# Patient Record
Sex: MALE | Race: WHITE | NOT HISPANIC OR LATINO | Employment: UNEMPLOYED | URBAN - NONMETROPOLITAN AREA
[De-identification: names, ages, dates, MRNs, and addresses within clinical notes are randomized per-mention and may not be internally consistent; named-entity substitution may affect disease eponyms.]

---

## 2019-05-14 ENCOUNTER — OFFICE VISIT (OUTPATIENT)
Dept: CARDIOLOGY | Facility: CLINIC | Age: 79
End: 2019-05-14

## 2019-05-14 VITALS
BODY MASS INDEX: 21.01 KG/M2 | HEIGHT: 68 IN | SYSTOLIC BLOOD PRESSURE: 160 MMHG | HEART RATE: 52 BPM | DIASTOLIC BLOOD PRESSURE: 72 MMHG | WEIGHT: 138.6 LBS | OXYGEN SATURATION: 100 %

## 2019-05-14 DIAGNOSIS — I95.1 ORTHOSTASIS: ICD-10-CM

## 2019-05-14 DIAGNOSIS — R06.02 SHORTNESS OF BREATH: ICD-10-CM

## 2019-05-14 DIAGNOSIS — R00.0 TACHYCARDIA: ICD-10-CM

## 2019-05-14 DIAGNOSIS — I10 ESSENTIAL HYPERTENSION: Primary | ICD-10-CM

## 2019-05-14 DIAGNOSIS — R42 DIZZINESS: ICD-10-CM

## 2019-05-14 PROCEDURE — 93000 ELECTROCARDIOGRAM COMPLETE: CPT | Performed by: PHYSICIAN ASSISTANT

## 2019-05-14 PROCEDURE — 99204 OFFICE O/P NEW MOD 45 MIN: CPT | Performed by: PHYSICIAN ASSISTANT

## 2019-05-14 RX ORDER — ALBUTEROL SULFATE 1.25 MG/3ML
1 SOLUTION RESPIRATORY (INHALATION) EVERY 6 HOURS PRN
COMMUNITY

## 2019-05-14 RX ORDER — OLANZAPINE 7.5 MG/1
7.5 TABLET ORAL NIGHTLY
COMMUNITY

## 2019-05-14 RX ORDER — LATANOPROST 50 UG/ML
1 SOLUTION/ DROPS OPHTHALMIC NIGHTLY
COMMUNITY

## 2019-05-14 RX ORDER — FERROUS SULFATE 325(65) MG
325 TABLET ORAL
COMMUNITY

## 2019-05-14 RX ORDER — CHLORAL HYDRATE 500 MG
1000 CAPSULE ORAL
COMMUNITY

## 2019-05-14 RX ORDER — ERGOCALCIFEROL 1.25 MG/1
50000 CAPSULE ORAL WEEKLY
COMMUNITY

## 2019-05-14 RX ORDER — BENZONATATE 100 MG/1
100 CAPSULE ORAL AS NEEDED
COMMUNITY

## 2019-05-14 RX ORDER — ATORVASTATIN CALCIUM 20 MG/1
20 TABLET, FILM COATED ORAL DAILY
COMMUNITY

## 2019-05-14 RX ORDER — BENZTROPINE MESYLATE 1 MG/1
1 TABLET ORAL DAILY
COMMUNITY

## 2019-05-14 RX ORDER — OMEPRAZOLE 20 MG/1
20 CAPSULE, DELAYED RELEASE ORAL DAILY
COMMUNITY

## 2019-05-14 RX ORDER — SERTRALINE HYDROCHLORIDE 100 MG/1
100 TABLET, FILM COATED ORAL DAILY
COMMUNITY

## 2019-05-14 RX ORDER — NAPROXEN SODIUM 550 MG/1
550 TABLET ORAL 2 TIMES DAILY PRN
COMMUNITY

## 2019-05-14 RX ORDER — NIACIN 500 MG
500 TABLET ORAL NIGHTLY
COMMUNITY

## 2019-05-14 RX ORDER — LISINOPRIL 10 MG/1
10 TABLET ORAL DAILY
COMMUNITY

## 2019-05-14 RX ORDER — LOPERAMIDE HYDROCHLORIDE 2 MG/1
2 CAPSULE ORAL AS NEEDED
COMMUNITY

## 2019-05-14 NOTE — PROGRESS NOTES
Subjective   Killian Barrios is a 79 y.o. male     Chief Complaint   Patient presents with   • Establish Care   • Hypertension       HPI  The patient presents today for initial evaluation.  He is referred in the setting of hypotension, orthostasis, and presyncope/dizziness.  The patient denies cardiovascular history.  He has had no cardiovascular evaluation in the past.  The patient is a resident of a long-term care facility.  Over the past few weeks he has started noticing more dizziness, weakness, and presyncope.  He has even had falls because of these episodes.  Blood pressures have been checked and the patient is noted to be hypotensive.  He initially was discontinued off amlodipine.  Then eventually was discontinued off metoprolol because of ongoing hypotension which was symptomatic.  He is very hypertensive today but typically has a blood pressure of 120/80 and last when checked at the long-term care facility.  The patient tells me that his presyncopal episodes typically are heralded by weakness and dizziness.  He will then have lower extremity weakness, occasionally severe to the point where he will fall to the ground.  He has no associated chest pain at that time.  He has ongoing dyspnea which can be fairly significant when he gets very dizzy.  At baseline, the patient has had no PND orthopnea.  Labs recently apparently were unremarkable.  He has no further complaints otherwise.      Current Outpatient Medications   Medication Sig Dispense Refill   • albuterol (ACCUNEB) 1.25 MG/3ML nebulizer solution Take 1 ampule by nebulization Every 6 (Six) Hours As Needed for Wheezing.     • ascorbic acid (VITAMIN C) 1000 MG tablet Take 1,000 mg by mouth Daily.     • atorvastatin (LIPITOR) 20 MG tablet Take 20 mg by mouth Daily.     • benzonatate (TESSALON) 100 MG capsule Take 100 mg by mouth As Needed for Cough.     • benztropine (COGENTIN) 1 MG tablet Take 1 mg by mouth Daily.     • ferrous sulfate 325 (65 FE) MG tablet  Take 325 mg by mouth Daily With Breakfast.     • latanoprost (XALATAN) 0.005 % ophthalmic solution 1 drop Every Night.     • lisinopril (PRINIVIL,ZESTRIL) 10 MG tablet Take 10 mg by mouth Daily.     • loperamide (IMODIUM) 2 MG capsule Take 2 mg by mouth As Needed for Diarrhea.     • naproxen sodium (ANAPROX) 550 MG tablet Take 550 mg by mouth 2 (Two) Times a Day As Needed for Mild Pain .     • niacin 500 MG tablet Take 500 mg by mouth Every Night.     • OLANZapine (zyPREXA) 7.5 MG tablet Take 7.5 mg by mouth Every Night.     • Omega-3 Fatty Acids (FISH OIL) 1000 MG capsule capsule Take 1,000 mg by mouth Daily With Breakfast.     • omeprazole (priLOSEC) 20 MG capsule Take 20 mg by mouth Daily.     • sertraline (ZOLOFT) 100 MG tablet Take 100 mg by mouth Daily.     • vitamin D (ERGOCALCIFEROL) 19721 units capsule capsule Take 50,000 Units by mouth 1 (One) Time Per Week.       No current facility-administered medications for this visit.        Patient has no known allergies.    Past Medical History:   Diagnosis Date   • Cancer (CMS/HCC)     skin CA   • Hyperlipidemia    • Hypertension        Social History     Socioeconomic History   • Marital status:      Spouse name: Not on file   • Number of children: Not on file   • Years of education: Not on file   • Highest education level: Not on file   Tobacco Use   • Smoking status: Former Smoker     Packs/day: 0.50     Years: 30.00     Pack years: 15.00     Types: Cigarettes   • Smokeless tobacco: Never Used   Substance and Sexual Activity   • Alcohol use: No     Frequency: Never   • Drug use: No   • Sexual activity: Defer       Family History   Problem Relation Age of Onset   • Hypertension Mother    • Lung cancer Father        Review of Systems   Constitutional: Positive for fatigue (easily fatigued).   HENT: Negative.  Negative for congestion, rhinorrhea and sneezing.    Eyes: Positive for visual disturbance (wears glasses daily).   Respiratory: Positive for cough  "(cough with blood in spuctum) and shortness of breath (easily SOA ; worse on exertion ). Negative for chest tightness and wheezing.    Cardiovascular: Negative.  Negative for chest pain, palpitations and leg swelling.   Gastrointestinal: Negative.  Negative for abdominal pain, nausea and vomiting.   Endocrine: Negative.  Negative for cold intolerance and heat intolerance.   Genitourinary: Positive for urgency (urinary urgency). Negative for difficulty urinating and frequency.   Musculoskeletal: Negative.  Negative for arthralgias, back pain, neck pain and neck stiffness.   Skin: Negative.  Negative for rash and wound.   Allergic/Immunologic: Negative.  Negative for environmental allergies and food allergies.   Neurological: Positive for weakness (increased generalized weakness). Negative for dizziness, syncope, light-headedness and headaches.   Hematological: Negative.  Does not bruise/bleed easily.   Psychiatric/Behavioral: Positive for confusion (easily confused). Negative for agitation and sleep disturbance (denies waking up smothering/SOA). The patient is nervous/anxious (easily nervous/anxious).        Objective     Vitals:    05/14/19 1425   BP: 160/72   BP Location: Left arm   Patient Position: Sitting   Pulse: 52   SpO2: 100%   Weight: 62.9 kg (138 lb 9.6 oz)   Height: 172.7 cm (68\")        /72 (BP Location: Left arm, Patient Position: Sitting)   Pulse 52   Ht 172.7 cm (68\")   Wt 62.9 kg (138 lb 9.6 oz)   SpO2 100%   BMI 21.07 kg/m²      Lab Results (most recent)     None          Physical Exam   Constitutional: He is oriented to person, place, and time. He appears well-developed and well-nourished. No distress.   HENT:   Head: Normocephalic and atraumatic.   Eyes: Conjunctivae and EOM are normal. Pupils are equal, round, and reactive to light.   Neck: Normal range of motion. Neck supple. No JVD present. No tracheal deviation present.   Cardiovascular: Normal rate, regular rhythm, normal heart " sounds and intact distal pulses.   Pulmonary/Chest: Effort normal and breath sounds normal.   Abdominal: Soft. Bowel sounds are normal. He exhibits no distension and no mass. There is no tenderness. There is no rebound and no guarding.   Musculoskeletal: Normal range of motion. He exhibits no edema, tenderness or deformity.   Neurological: He is alert and oriented to person, place, and time.   Skin: Skin is warm and dry. No rash noted. No erythema. No pallor.   Psychiatric: He has a normal mood and affect. His behavior is normal. Judgment and thought content normal.   Nursing note and vitals reviewed.      Procedure     ECG 12 Lead  Date/Time: 5/14/2019 2:47 PM  Performed by: Pritesh Capone PA  Authorized by: Pritesh Capone PA   Comments: HTN    Sinus rhythm at 123, right axis deviation, incomplete right bundle branch block morphology, nonspecific ST and T wave changes which are minor, no acute changes noted.  No EKGs are utilized for comparison.                 Assessment/Plan      Diagnosis Plan   1. Essential hypertension  ECG 12 Lead    Adult Transthoracic Echo Complete W/ Cont if Necessary Per Protocol    Cardiac Event Monitor    Duplex Carotid Ultrasound CAR   2. Orthostasis  Adult Transthoracic Echo Complete W/ Cont if Necessary Per Protocol    Cardiac Event Monitor    Duplex Carotid Ultrasound CAR   3. Tachycardia  Adult Transthoracic Echo Complete W/ Cont if Necessary Per Protocol    Cardiac Event Monitor    Duplex Carotid Ultrasound CAR   4. Shortness of breath  Adult Transthoracic Echo Complete W/ Cont if Necessary Per Protocol    Cardiac Event Monitor    Duplex Carotid Ultrasound CAR   5. Dizziness  Duplex Carotid Ultrasound CAR       1.  The patient has orthostasis by exam today.  He was symptomatic of dizziness presyncope at that time by his report.  This mimics his episodes which he has at his long-term care facility.  These are improving however since metoprolol and amlodipine have been  discontinued.  I would like to decrease lisinopril at least to 5 mg daily.  I do not feel that this is contributing significantly to his orthostasis, however he has ongoing hypotension at times.  Hopefully will improve with some of the symptoms was decreasing that therapy as well.    2.  Otherwise, with ongoing symptoms of dizziness/presyncope, I feel the patient needs further cardiac evaluation otherwise.  I will schedule for a 14-day event monitor.  We will schedule for an echocardiogram to evaluate him structurally.  He needs to be evaluated for potential rate or rhythm disturbance contributing to symptoms.  He will need a carotid duplex as well given symptoms.    3.  I would like to see him back to review all the above studies.  At this time, shortness of air and symptoms otherwise are mostly stable.  We can make further recommendations once we can review results of the above studies.  He will call prior to his follow-up for any complications.         Patient's Body mass index is 21.07 kg/m². BMI is within normal parameters. No follow-up required..           Electronically signed by:

## 2019-06-04 ENCOUNTER — HOSPITAL ENCOUNTER (OUTPATIENT)
Dept: CARDIOLOGY | Facility: HOSPITAL | Age: 79
Discharge: HOME | End: 2019-06-04
Admitting: PHYSICIAN ASSISTANT

## 2019-06-04 ENCOUNTER — HOSPITAL ENCOUNTER (OUTPATIENT)
Dept: CARDIOLOGY | Facility: HOSPITAL | Age: 79
Discharge: HOME | End: 2019-06-04

## 2019-06-04 DIAGNOSIS — R06.02 SHORTNESS OF BREATH: ICD-10-CM

## 2019-06-04 DIAGNOSIS — R00.0 TACHYCARDIA: ICD-10-CM

## 2019-06-04 DIAGNOSIS — I10 ESSENTIAL HYPERTENSION: ICD-10-CM

## 2019-06-04 DIAGNOSIS — R42 DIZZINESS: ICD-10-CM

## 2019-06-04 DIAGNOSIS — I95.1 ORTHOSTASIS: ICD-10-CM

## 2019-06-04 PROCEDURE — 93880 EXTRACRANIAL BILAT STUDY: CPT

## 2019-06-04 PROCEDURE — 93306 TTE W/DOPPLER COMPLETE: CPT

## 2019-06-04 PROCEDURE — 93880 EXTRACRANIAL BILAT STUDY: CPT | Performed by: INTERNAL MEDICINE

## 2019-06-04 PROCEDURE — 93306 TTE W/DOPPLER COMPLETE: CPT | Performed by: INTERNAL MEDICINE

## 2019-06-10 LAB
BH CV ECHO MEAS - ACS: 2 CM
BH CV ECHO MEAS - AO MEAN PG: 2.7 MMHG
BH CV ECHO MEAS - AO ROOT AREA (BSA CORRECTED): 1.9
BH CV ECHO MEAS - AO ROOT AREA: 9.1 CM^2
BH CV ECHO MEAS - AO ROOT DIAM: 3.4 CM
BH CV ECHO MEAS - AO V2 MEAN: 76.1 CM/SEC
BH CV ECHO MEAS - AO V2 VTI: 19.7 CM
BH CV ECHO MEAS - BSA(HAYCOCK): 1.7 M^2
BH CV ECHO MEAS - BSA(HAYCOCK): 1.7 M^2
BH CV ECHO MEAS - BSA: 1.7 M^2
BH CV ECHO MEAS - BSA: 1.7 M^2
BH CV ECHO MEAS - BZI_BMI: 21 KILOGRAMS/M^2
BH CV ECHO MEAS - BZI_BMI: 21 KILOGRAMS/M^2
BH CV ECHO MEAS - BZI_METRIC_HEIGHT: 172.7 CM
BH CV ECHO MEAS - BZI_METRIC_HEIGHT: 172.7 CM
BH CV ECHO MEAS - BZI_METRIC_WEIGHT: 62.6 KG
BH CV ECHO MEAS - BZI_METRIC_WEIGHT: 62.6 KG
BH CV ECHO MEAS - EDV(CUBED): 60 ML
BH CV ECHO MEAS - EDV(MOD-SP4): 39 ML
BH CV ECHO MEAS - EDV(TEICH): 66.5 ML
BH CV ECHO MEAS - EF(CUBED): 54.9 %
BH CV ECHO MEAS - EF(MOD-SP4): 59 %
BH CV ECHO MEAS - EF(TEICH): 47.2 %
BH CV ECHO MEAS - ESV(CUBED): 27.1 ML
BH CV ECHO MEAS - ESV(MOD-SP4): 16 ML
BH CV ECHO MEAS - ESV(TEICH): 35.1 ML
BH CV ECHO MEAS - FS: 23.3 %
BH CV ECHO MEAS - IVS/LVPW: 0.93
BH CV ECHO MEAS - IVSD: 1.3 CM
BH CV ECHO MEAS - LA DIMENSION: 3 CM
BH CV ECHO MEAS - LA/AO: 0.89
BH CV ECHO MEAS - LV DIASTOLIC VOL/BSA (35-75): 22.3 ML/M^2
BH CV ECHO MEAS - LV IVRT: 0.12 SEC
BH CV ECHO MEAS - LV MASS(C)D: 193.4 GRAMS
BH CV ECHO MEAS - LV MASS(C)DI: 110.8 GRAMS/M^2
BH CV ECHO MEAS - LV SYSTOLIC VOL/BSA (12-30): 9.2 ML/M^2
BH CV ECHO MEAS - LVIDD: 3.9 CM
BH CV ECHO MEAS - LVIDS: 3 CM
BH CV ECHO MEAS - LVLD AP4: 6.8 CM
BH CV ECHO MEAS - LVLS AP4: 4.9 CM
BH CV ECHO MEAS - LVOT AREA (M): 2.8 CM^2
BH CV ECHO MEAS - LVOT AREA: 2.8 CM^2
BH CV ECHO MEAS - LVOT DIAM: 1.9 CM
BH CV ECHO MEAS - LVPWD: 1.4 CM
BH CV ECHO MEAS - MV A MAX VEL: 74 CM/SEC
BH CV ECHO MEAS - MV DEC SLOPE: 201.3 CM/SEC^2
BH CV ECHO MEAS - MV E MAX VEL: 48.9 CM/SEC
BH CV ECHO MEAS - MV E/A: 0.66
BH CV ECHO MEAS - RVDD: 2.7 CM
BH CV ECHO MEAS - SI(AO): 102.4 ML/M^2
BH CV ECHO MEAS - SI(CUBED): 18.9 ML/M^2
BH CV ECHO MEAS - SI(MOD-SP4): 13.2 ML/M^2
BH CV ECHO MEAS - SI(TEICH): 18 ML/M^2
BH CV ECHO MEAS - SV(AO): 178.7 ML
BH CV ECHO MEAS - SV(CUBED): 32.9 ML
BH CV ECHO MEAS - SV(MOD-SP4): 23 ML
BH CV ECHO MEAS - SV(TEICH): 31.4 ML
BH CV XLRA MEAS LEFT BULB EDV: 12.9 CM/SEC
BH CV XLRA MEAS LEFT BULB PSV: 55.9 CM/SEC
BH CV XLRA MEAS LEFT CCA RATIO VEL: 68.2 CM/SEC
BH CV XLRA MEAS LEFT DIST CCA EDV: 12.9 CM/SEC
BH CV XLRA MEAS LEFT DIST CCA PSV: 68.2 CM/SEC
BH CV XLRA MEAS LEFT DIST ICA EDV: -29 CM/SEC
BH CV XLRA MEAS LEFT DIST ICA PSV: -146 CM/SEC
BH CV XLRA MEAS LEFT ICA RATIO VEL: -146 CM/SEC
BH CV XLRA MEAS LEFT ICA/CCA RATIO: -2.1
BH CV XLRA MEAS LEFT MID CCA EDV: 11.3 CM/SEC
BH CV XLRA MEAS LEFT MID CCA PSV: 76.3 CM/SEC
BH CV XLRA MEAS LEFT MID ICA EDV: -26.7 CM/SEC
BH CV XLRA MEAS LEFT MID ICA PSV: -111 CM/SEC
BH CV XLRA MEAS LEFT PROX CCA EDV: 11.3 CM/SEC
BH CV XLRA MEAS LEFT PROX CCA PSV: 81.6 CM/SEC
BH CV XLRA MEAS LEFT PROX ECA EDV: 0 CM/SEC
BH CV XLRA MEAS LEFT PROX ECA PSV: -128 CM/SEC
BH CV XLRA MEAS LEFT PROX ICA EDV: -26 CM/SEC
BH CV XLRA MEAS LEFT PROX ICA PSV: -95.5 CM/SEC
BH CV XLRA MEAS LEFT VERTEBRAL A EDV: 14.5 CM/SEC
BH CV XLRA MEAS LEFT VERTEBRAL A PSV: 63.4 CM/SEC
BH CV XLRA MEAS RIGHT BULB EDV: 5.9 CM/SEC
BH CV XLRA MEAS RIGHT BULB PSV: 43 CM/SEC
BH CV XLRA MEAS RIGHT CCA RATIO VEL: 63.9 CM/SEC
BH CV XLRA MEAS RIGHT DIST CCA EDV: 5.9 CM/SEC
BH CV XLRA MEAS RIGHT DIST CCA PSV: 63.9 CM/SEC
BH CV XLRA MEAS RIGHT DIST ICA EDV: -25.8 CM/SEC
BH CV XLRA MEAS RIGHT DIST ICA PSV: -94.5 CM/SEC
BH CV XLRA MEAS RIGHT ICA RATIO VEL: -94.5 CM/SEC
BH CV XLRA MEAS RIGHT ICA/CCA RATIO: -1.5
BH CV XLRA MEAS RIGHT MID CCA EDV: 9.7 CM/SEC
BH CV XLRA MEAS RIGHT MID CCA PSV: 67.7 CM/SEC
BH CV XLRA MEAS RIGHT MID ICA EDV: -14 CM/SEC
BH CV XLRA MEAS RIGHT MID ICA PSV: -78.4 CM/SEC
BH CV XLRA MEAS RIGHT PROX CCA EDV: 5.4 CM/SEC
BH CV XLRA MEAS RIGHT PROX CCA PSV: 63.9 CM/SEC
BH CV XLRA MEAS RIGHT PROX ECA EDV: 0 CM/SEC
BH CV XLRA MEAS RIGHT PROX ECA PSV: -91.8 CM/SEC
BH CV XLRA MEAS RIGHT PROX ICA EDV: -20.9 CM/SEC
BH CV XLRA MEAS RIGHT PROX ICA PSV: -87.5 CM/SEC
BH CV XLRA MEAS RIGHT VERTEBRAL A EDV: 9.7 CM/SEC
BH CV XLRA MEAS RIGHT VERTEBRAL A PSV: 58 CM/SEC
MAXIMAL PREDICTED HEART RATE: 140 BPM
STRESS TARGET HR: 119 BPM

## 2019-06-25 ENCOUNTER — TELEPHONE (OUTPATIENT)
Dept: CARDIOLOGY | Facility: CLINIC | Age: 79
End: 2019-06-25

## 2019-06-25 NOTE — TELEPHONE ENCOUNTER
Attempted to call patients wife multiple times and got no returned call. This patient is in assisted living. -BH;CCMA    Result Notes for Adult Transthoracic Echo Complete W/ Cont if Necessary Per Protocol     Notes recorded by Manda Salazar MA on 6/13/2019 at 2:10 PM EDT  Called and left message to return call on wife's home number. -EVERETT;CCMA  ------    Notes recorded by Manda Salazar MA on 6/11/2019 at 2:40 PM EDT  Called and left message on wife's home number to return call. -BH;CCMA  ------    Notes recorded by Jim aBrahona PA on 6/11/2019 at 11:32 AM EDT  Routine follow up no new orders  ------    Notes recorded by Manda Salazar MA on 6/11/2019 at 10:16 AM EDT  Any new orders prior to OV? -EVERETT;ALEXYA   Adult Transthoracic Echo Complete W/ Cont if Necessary Per Protocol   Order: [id number]   Status:  Final result   Visible to patient:  No (Not Released) Dx:  Shortness of breath; Essential hypert...   Details     Reading Physician Reading Date Result Priority   Jaziel Beach MD 6/4/2019 Routine      Result Text     Severely technically limited study.     1.  Global LV systolic function is likely not severely depressed.  Right heart chambers appear to be at the upper limits of normal size to mildly dilated.  Grade 1 diastolic dysfunction.     2.  Limited Doppler sampling excludes high-grade stenotic lesions or significant valvular regurgitation.     3.  There is no large pericardial effusion.     4.  No other interpretation can be rendered.         Result Notes for Duplex Carotid Ultrasound CAR     Notes recorded by Manda Salazar MA on 6/13/2019 at 2:10 PM EDT  Called and left message on patients wife home number to return call. -EVERETT;CCMA  ------    Notes recorded by Manda Salazar MA on 6/11/2019 at 2:40 PM EDT  Called and left message on wife's home number to return call. -BH;CCMA  ------    Notes recorded by Jim Barahona PA on 6/11/2019 at 11:33 AM EDT  Routine follow  up  ------    Notes recorded by Manda Salazar MA on 6/11/2019 at 10:15 AM EDT  Any new orders prior to follow up ? Carotid appears to be nonobstructive. -;[facility]   Duplex Carotid Ultrasound CAR   Order: [id number]   Status:  Final result   Visible to patient:  No (Not Released) Dx:  Shortness of breath; Dizziness; [place]...   Details     Reading Physician Reading Date Result Priority   Jaziel Beach MD 6/4/2019 Routine      Result Text     1.  Both common carotid arteries are widely patent with minor scattered nonobstructive plaque identified.     2.  There is mild bifurcation disease bilaterally with 16 to 49% stenosis by Doppler sampling and no significant luminal encroachment on echocardiographic imaging.     3.  16 to 49% stenosis in the proximal left internal carotid artery.  16 to 49% stenosis at multiple levels in the left internal carotid artery.     4.  Antegrade flow in both vertebral arteries.     Summary: Nonobstructive carotid disease bilaterally as above.  Antegrade flow both vertebral arteries